# Patient Record
Sex: FEMALE | Race: WHITE | NOT HISPANIC OR LATINO
[De-identification: names, ages, dates, MRNs, and addresses within clinical notes are randomized per-mention and may not be internally consistent; named-entity substitution may affect disease eponyms.]

---

## 2022-06-17 ENCOUNTER — TRANSCRIPTION ENCOUNTER (OUTPATIENT)
Age: 67
End: 2022-06-17

## 2022-06-18 ENCOUNTER — INPATIENT (INPATIENT)
Facility: HOSPITAL | Age: 67
LOS: 0 days | Discharge: HOME | DRG: 340 | End: 2022-06-19
Attending: SURGERY | Admitting: SURGERY
Payer: COMMERCIAL

## 2022-06-18 ENCOUNTER — RESULT REVIEW (OUTPATIENT)
Age: 67
End: 2022-06-18

## 2022-06-18 ENCOUNTER — TRANSCRIPTION ENCOUNTER (OUTPATIENT)
Age: 67
End: 2022-06-18

## 2022-06-18 VITALS
HEART RATE: 105 BPM | RESPIRATION RATE: 16 BRPM | OXYGEN SATURATION: 98 % | TEMPERATURE: 98 F | HEIGHT: 66.14 IN | DIASTOLIC BLOOD PRESSURE: 99 MMHG | WEIGHT: 136.69 LBS | SYSTOLIC BLOOD PRESSURE: 170 MMHG

## 2022-06-18 DIAGNOSIS — Z92.21 PERSONAL HISTORY OF ANTINEOPLASTIC CHEMOTHERAPY: ICD-10-CM

## 2022-06-18 DIAGNOSIS — E78.5 HYPERLIPIDEMIA, UNSPECIFIED: ICD-10-CM

## 2022-06-18 DIAGNOSIS — I10 ESSENTIAL (PRIMARY) HYPERTENSION: ICD-10-CM

## 2022-06-18 DIAGNOSIS — Z98.890 OTHER SPECIFIED POSTPROCEDURAL STATES: Chronic | ICD-10-CM

## 2022-06-18 DIAGNOSIS — D18.03 HEMANGIOMA OF INTRA-ABDOMINAL STRUCTURES: ICD-10-CM

## 2022-06-18 DIAGNOSIS — Z88.0 ALLERGY STATUS TO PENICILLIN: ICD-10-CM

## 2022-06-18 DIAGNOSIS — Z98.891 HISTORY OF UTERINE SCAR FROM PREVIOUS SURGERY: Chronic | ICD-10-CM

## 2022-06-18 DIAGNOSIS — K35.33 ACUTE APPENDICITIS WITH PERFORATION, LOCALIZED PERITONITIS, AND GANGRENE, WITH ABSCESS: ICD-10-CM

## 2022-06-18 DIAGNOSIS — Z85.3 PERSONAL HISTORY OF MALIGNANT NEOPLASM OF BREAST: ICD-10-CM

## 2022-06-18 LAB
ALBUMIN SERPL ELPH-MCNC: 3.5 G/DL — SIGNIFICANT CHANGE UP (ref 3.4–5)
ALP SERPL-CCNC: 62 U/L — SIGNIFICANT CHANGE UP (ref 40–120)
ALT FLD-CCNC: 61 U/L — HIGH (ref 12–42)
ANION GAP SERPL CALC-SCNC: 7 MMOL/L — LOW (ref 9–16)
APPEARANCE UR: CLEAR — SIGNIFICANT CHANGE UP
APTT BLD: 28.2 SEC — SIGNIFICANT CHANGE UP (ref 27.5–35.5)
AST SERPL-CCNC: 42 U/L — HIGH (ref 15–37)
BACTERIA # UR AUTO: PRESENT /HPF
BASOPHILS # BLD AUTO: 0.04 K/UL — SIGNIFICANT CHANGE UP (ref 0–0.2)
BASOPHILS NFR BLD AUTO: 0.3 % — SIGNIFICANT CHANGE UP (ref 0–2)
BILIRUB SERPL-MCNC: 0.9 MG/DL — SIGNIFICANT CHANGE UP (ref 0.2–1.2)
BILIRUB UR-MCNC: NEGATIVE — SIGNIFICANT CHANGE UP
BLD GP AB SCN SERPL QL: NEGATIVE — SIGNIFICANT CHANGE UP
BLD GP AB SCN SERPL QL: NEGATIVE — SIGNIFICANT CHANGE UP
BUN SERPL-MCNC: 14 MG/DL — SIGNIFICANT CHANGE UP (ref 7–23)
CALCIUM SERPL-MCNC: 8.9 MG/DL — SIGNIFICANT CHANGE UP (ref 8.5–10.5)
CHLORIDE SERPL-SCNC: 101 MMOL/L — SIGNIFICANT CHANGE UP (ref 96–108)
CO2 SERPL-SCNC: 29 MMOL/L — SIGNIFICANT CHANGE UP (ref 22–31)
COLOR SPEC: YELLOW — SIGNIFICANT CHANGE UP
CREAT SERPL-MCNC: 0.76 MG/DL — SIGNIFICANT CHANGE UP (ref 0.5–1.3)
DIFF PNL FLD: ABNORMAL
EGFR: 86 ML/MIN/1.73M2 — SIGNIFICANT CHANGE UP
EOSINOPHIL # BLD AUTO: 0.01 K/UL — SIGNIFICANT CHANGE UP (ref 0–0.5)
EOSINOPHIL NFR BLD AUTO: 0.1 % — SIGNIFICANT CHANGE UP (ref 0–6)
EPI CELLS # UR: ABNORMAL /HPF (ref 0–5)
GLUCOSE SERPL-MCNC: 132 MG/DL — HIGH (ref 70–99)
GLUCOSE UR QL: NEGATIVE — SIGNIFICANT CHANGE UP
HCT VFR BLD CALC: 42.6 % — SIGNIFICANT CHANGE UP (ref 34.5–45)
HGB BLD-MCNC: 14 G/DL — SIGNIFICANT CHANGE UP (ref 11.5–15.5)
IMM GRANULOCYTES NFR BLD AUTO: 0.6 % — SIGNIFICANT CHANGE UP (ref 0–1.5)
INR BLD: 1.06 — SIGNIFICANT CHANGE UP (ref 0.88–1.16)
KETONES UR-MCNC: NEGATIVE — SIGNIFICANT CHANGE UP
LACTATE SERPL-SCNC: 0.7 MMOL/L — SIGNIFICANT CHANGE UP (ref 0.4–2)
LEUKOCYTE ESTERASE UR-ACNC: NEGATIVE — SIGNIFICANT CHANGE UP
LIDOCAIN IGE QN: 54 U/L — LOW (ref 73–393)
LYMPHOCYTES # BLD AUTO: 1.01 K/UL — SIGNIFICANT CHANGE UP (ref 1–3.3)
LYMPHOCYTES # BLD AUTO: 6.5 % — LOW (ref 13–44)
MCHC RBC-ENTMCNC: 31.7 PG — SIGNIFICANT CHANGE UP (ref 27–34)
MCHC RBC-ENTMCNC: 32.9 GM/DL — SIGNIFICANT CHANGE UP (ref 32–36)
MCV RBC AUTO: 96.4 FL — SIGNIFICANT CHANGE UP (ref 80–100)
MONOCYTES # BLD AUTO: 0.99 K/UL — HIGH (ref 0–0.9)
MONOCYTES NFR BLD AUTO: 6.4 % — SIGNIFICANT CHANGE UP (ref 2–14)
NEUTROPHILS # BLD AUTO: 13.38 K/UL — HIGH (ref 1.8–7.4)
NEUTROPHILS NFR BLD AUTO: 86.1 % — HIGH (ref 43–77)
NITRITE UR-MCNC: NEGATIVE — SIGNIFICANT CHANGE UP
NRBC # BLD: 0 /100 WBCS — SIGNIFICANT CHANGE UP (ref 0–0)
PH UR: 6 — SIGNIFICANT CHANGE UP (ref 5–8)
PLATELET # BLD AUTO: 222 K/UL — SIGNIFICANT CHANGE UP (ref 150–400)
POTASSIUM SERPL-MCNC: 3.6 MMOL/L — SIGNIFICANT CHANGE UP (ref 3.5–5.3)
POTASSIUM SERPL-SCNC: 3.6 MMOL/L — SIGNIFICANT CHANGE UP (ref 3.5–5.3)
PROT SERPL-MCNC: 7.7 G/DL — SIGNIFICANT CHANGE UP (ref 6.4–8.2)
PROT UR-MCNC: NEGATIVE MG/DL — SIGNIFICANT CHANGE UP
PROTHROM AB SERPL-ACNC: 12.4 SEC — SIGNIFICANT CHANGE UP (ref 10.5–13.4)
RBC # BLD: 4.42 M/UL — SIGNIFICANT CHANGE UP (ref 3.8–5.2)
RBC # FLD: 12.3 % — SIGNIFICANT CHANGE UP (ref 10.3–14.5)
RBC CASTS # UR COMP ASSIST: < 5 /HPF — SIGNIFICANT CHANGE UP
RH IG SCN BLD-IMP: POSITIVE — SIGNIFICANT CHANGE UP
RH IG SCN BLD-IMP: POSITIVE — SIGNIFICANT CHANGE UP
SARS-COV-2 RNA SPEC QL NAA+PROBE: SIGNIFICANT CHANGE UP
SODIUM SERPL-SCNC: 137 MMOL/L — SIGNIFICANT CHANGE UP (ref 132–145)
SP GR SPEC: <=1.005 — SIGNIFICANT CHANGE UP (ref 1–1.03)
UROBILINOGEN FLD QL: 0.2 E.U./DL — SIGNIFICANT CHANGE UP
WBC # BLD: 15.52 K/UL — HIGH (ref 3.8–10.5)
WBC # FLD AUTO: 15.52 K/UL — HIGH (ref 3.8–10.5)
WBC UR QL: < 5 /HPF — SIGNIFICANT CHANGE UP

## 2022-06-18 PROCEDURE — 74176 CT ABD & PELVIS W/O CONTRAST: CPT | Mod: 26

## 2022-06-18 PROCEDURE — 99285 EMERGENCY DEPT VISIT HI MDM: CPT

## 2022-06-18 PROCEDURE — 88304 TISSUE EXAM BY PATHOLOGIST: CPT | Mod: 26

## 2022-06-18 PROCEDURE — 44970 LAPAROSCOPY APPENDECTOMY: CPT | Mod: GC

## 2022-06-18 PROCEDURE — 99222 1ST HOSP IP/OBS MODERATE 55: CPT | Mod: GC,57

## 2022-06-18 DEVICE — STAPLER COVIDIEN TRI-STAPLE 60MM PURPLE RELOAD: Type: IMPLANTABLE DEVICE | Status: FUNCTIONAL

## 2022-06-18 RX ORDER — METRONIDAZOLE 500 MG
500 TABLET ORAL ONCE
Refills: 0 | Status: COMPLETED | OUTPATIENT
Start: 2022-06-18 | End: 2022-06-18

## 2022-06-18 RX ORDER — LOSARTAN POTASSIUM 100 MG/1
1 TABLET, FILM COATED ORAL
Qty: 0 | Refills: 0 | DISCHARGE

## 2022-06-18 RX ORDER — ATORVASTATIN CALCIUM 80 MG/1
20 TABLET, FILM COATED ORAL AT BEDTIME
Refills: 0 | Status: DISCONTINUED | OUTPATIENT
Start: 2022-06-18 | End: 2022-06-19

## 2022-06-18 RX ORDER — SODIUM CHLORIDE 9 MG/ML
1000 INJECTION INTRAMUSCULAR; INTRAVENOUS; SUBCUTANEOUS ONCE
Refills: 0 | Status: COMPLETED | OUTPATIENT
Start: 2022-06-18 | End: 2022-06-18

## 2022-06-18 RX ORDER — MORPHINE SULFATE 50 MG/1
4 CAPSULE, EXTENDED RELEASE ORAL ONCE
Refills: 0 | Status: DISCONTINUED | OUTPATIENT
Start: 2022-06-18 | End: 2022-06-18

## 2022-06-18 RX ORDER — SODIUM CHLORIDE 9 MG/ML
1000 INJECTION, SOLUTION INTRAVENOUS
Refills: 0 | Status: DISCONTINUED | OUTPATIENT
Start: 2022-06-18 | End: 2022-06-19

## 2022-06-18 RX ORDER — HEPARIN SODIUM 5000 [USP'U]/ML
5000 INJECTION INTRAVENOUS; SUBCUTANEOUS EVERY 8 HOURS
Refills: 0 | Status: DISCONTINUED | OUTPATIENT
Start: 2022-06-18 | End: 2022-06-19

## 2022-06-18 RX ORDER — ONDANSETRON 8 MG/1
4 TABLET, FILM COATED ORAL ONCE
Refills: 0 | Status: COMPLETED | OUTPATIENT
Start: 2022-06-18 | End: 2022-06-18

## 2022-06-18 RX ORDER — KETOROLAC TROMETHAMINE 30 MG/ML
15 SYRINGE (ML) INJECTION ONCE
Refills: 0 | Status: DISCONTINUED | OUTPATIENT
Start: 2022-06-18 | End: 2022-06-18

## 2022-06-18 RX ORDER — DIATRIZOATE MEGLUMINE 180 MG/ML
30 INJECTION, SOLUTION INTRAVESICAL ONCE
Refills: 0 | Status: COMPLETED | OUTPATIENT
Start: 2022-06-18 | End: 2022-06-18

## 2022-06-18 RX ORDER — ACETAMINOPHEN 500 MG
650 TABLET ORAL ONCE
Refills: 0 | Status: COMPLETED | OUTPATIENT
Start: 2022-06-18 | End: 2022-06-18

## 2022-06-18 RX ORDER — CIPROFLOXACIN LACTATE 400MG/40ML
400 VIAL (ML) INTRAVENOUS ONCE
Refills: 0 | Status: COMPLETED | OUTPATIENT
Start: 2022-06-18 | End: 2022-06-18

## 2022-06-18 RX ORDER — METRONIDAZOLE 500 MG
500 TABLET ORAL EVERY 8 HOURS
Refills: 0 | Status: DISCONTINUED | OUTPATIENT
Start: 2022-06-18 | End: 2022-06-19

## 2022-06-18 RX ORDER — ROSUVASTATIN CALCIUM 5 MG/1
1 TABLET ORAL
Qty: 0 | Refills: 0 | DISCHARGE

## 2022-06-18 RX ORDER — CIPROFLOXACIN LACTATE 400MG/40ML
400 VIAL (ML) INTRAVENOUS EVERY 12 HOURS
Refills: 0 | Status: DISCONTINUED | OUTPATIENT
Start: 2022-06-18 | End: 2022-06-19

## 2022-06-18 RX ADMIN — SODIUM CHLORIDE 1000 MILLILITER(S): 9 INJECTION INTRAMUSCULAR; INTRAVENOUS; SUBCUTANEOUS at 11:56

## 2022-06-18 RX ADMIN — Medication 100 MILLIGRAM(S): at 17:01

## 2022-06-18 RX ADMIN — DIATRIZOATE MEGLUMINE 30 MILLILITER(S): 180 INJECTION, SOLUTION INTRAVESICAL at 11:56

## 2022-06-18 RX ADMIN — MORPHINE SULFATE 4 MILLIGRAM(S): 50 CAPSULE, EXTENDED RELEASE ORAL at 17:55

## 2022-06-18 RX ADMIN — MORPHINE SULFATE 4 MILLIGRAM(S): 50 CAPSULE, EXTENDED RELEASE ORAL at 11:55

## 2022-06-18 RX ADMIN — Medication 650 MILLIGRAM(S): at 18:15

## 2022-06-18 RX ADMIN — Medication 15 MILLIGRAM(S): at 13:15

## 2022-06-18 RX ADMIN — Medication 200 MILLIGRAM(S): at 20:04

## 2022-06-18 RX ADMIN — ONDANSETRON 4 MILLIGRAM(S): 8 TABLET, FILM COATED ORAL at 11:56

## 2022-06-18 NOTE — ED ADULT NURSE REASSESSMENT NOTE - NS ED NURSE REASSESS COMMENT FT1
Pt resting in bed at this time. Pt deneis pain at this time. Pt updated on plan of care. Pending CT. Will contiue to monitor.

## 2022-06-18 NOTE — H&P ADULT - NSHPPHYSICALEXAM_GEN_ALL_CORE
LOS: 1d    VITALS:   T(C): 36.1 (06-18-22 @ 23:35), Max: 37.8 (06-18-22 @ 17:51)  HR: 93 (06-18-22 @ 23:46) (91 - 105)  BP: 138/73 (06-18-22 @ 23:46) (134/86 - 170/99)  RR: 25 (06-18-22 @ 23:46) (16 - 25)  SpO2: 97% (06-18-22 @ 23:46) (96% - 100%)    GENERAL: NAD, lying in bed comfortably  CHEST/LUNG: Unlabored respirations  HEART: Regular rate and rhythm;   ABDOMEN: Soft, tender at Mcburnie's point, nondistended  EXTREMITIES:  2+ Peripheral Pulses, brisk capillary refill. No clubbing, cyanosis, or edema  NERVOUS SYSTEM:  A&Ox3, no focal deficits   SKIN: No rashes or lesions

## 2022-06-18 NOTE — ED ADULT NURSE NOTE - OBJECTIVE STATEMENT
Pt is a 66y female complaining of RLQ abd pain associated with nausea, bloating and constipation x 2 days. Pt with RLQ tenderness. Pt denies fever and chills.

## 2022-06-18 NOTE — ED PROVIDER NOTE - NSICDXPASTMEDICALHX_GEN_ALL_CORE_FT
PAST MEDICAL HISTORY:  Breast cancer S/P lumpectomy & chemotherapy in 2015 (normal PET scan within past year)    Hyperlipidemia     Hypertension

## 2022-06-18 NOTE — ED ADULT NURSE NOTE - NSIMPLEMENTINTERV_GEN_ALL_ED
Implemented All Universal Safety Interventions:  [place] to call system. Call bell, personal items and telephone within reach. Instruct patient to call for assistance. Room bathroom lighting operational. Non-slip footwear when patient is off stretcher. Physically safe environment: no spills, clutter or unnecessary equipment. Stretcher in lowest position, wheels locked, appropriate side rails in place.

## 2022-06-18 NOTE — ED PROVIDER NOTE - OBJECTIVE STATEMENT
She is an extremely pleasant 66 yr old female who lives in [place], here with her son, whom she's currently visiting in [facility], who presents with She is an extremely pleasant 66 yr old female who lives in [place], here with her son, whom she's currently visiting in [facility], who presents with RUQ & RLQ abdominal pain which began 2 nights ago before flying to [facility].  She had one episode of vomiting yesterday and has had nausea & anorexia since then, with worsening pain today, now radiating to her right flank and into her back.  At first, she attributed it to a muscular strain, but she hasn't had any injury/fall.  She denies any fevers/chills.  BMs have been fairly normal except for one diarrheal stool.  No hematochezia or melena.  No urinary symptoms.  No hx of gall bladder issues or kidney stones.  Had a colonoscopy & polypectomy in the past.  No hx of diverticulitis.

## 2022-06-18 NOTE — H&P ADULT - NSHPLABSRESULTS_GEN_ALL_CORE
CBC Full  -  ( 18 Jun 2022 11:48 )  WBC Count : 15.52 K/uL  RBC Count : 4.42 M/uL  Hemoglobin : 14.0 g/dL  Hematocrit : 42.6 %  Platelet Count - Automated : 222 K/uL  Mean Cell Volume : 96.4 fl  Mean Cell Hemoglobin : 31.7 pg  Mean Cell Hemoglobin Concentration : 32.9 gm/dL  Auto Neutrophil # : 13.38 K/uL  Auto Lymphocyte # : 1.01 K/uL  Auto Monocyte # : 0.99 K/uL  Auto Eosinophil # : 0.01 K/uL  Auto Basophil # : 0.04 K/uL  Auto Neutrophil % : 86.1 %  Auto Lymphocyte % : 6.5 %  Auto Monocyte % : 6.4 %  Auto Eosinophil % : 0.1 %  Auto Basophil % : 0.3 %    06-18    137  |  101  |  14  ----------------------------<  132<H>  3.6   |  29  |  0.76    Ca    8.9      18 Jun 2022 11:48    TPro  7.7  /  Alb  3.5  /  TBili  0.9  /  DBili  x   /  AST  42<H>  /  ALT  61<H>  /  AlkPhos  62  06-18    LIVER FUNCTIONS - ( 18 Jun 2022 11:48 )  Alb: 3.5 g/dL / Pro: 7.7 g/dL / ALK PHOS: 62 U/L / ALT: 61 U/L / AST: 42 U/L / GGT: x           CAPILLARY BLOOD GLUCOSE        Urinalysis Basic - ( 18 Jun 2022 11:49 )    Color: Yellow / Appearance: Clear / SG: <=1.005 / pH: x  Gluc: x / Ketone: NEGATIVE  / Bili: NEGATIVE / Urobili: 0.2 E.U./dL   Blood: x / Protein: NEGATIVE mg/dL / Nitrite: NEGATIVE   Leuk Esterase: NEGATIVE / RBC: < 5 /HPF / WBC < 5 /HPF   Sq Epi: x / Non Sq Epi: 5-10 /HPF / Bacteria: Present /HPF      PT/INR - ( 18 Jun 2022 16:50 )   PT: 12.4 sec;   INR: 1.06          PTT - ( 18 Jun 2022 16:50 )  PTT:28.2 sec

## 2022-06-18 NOTE — H&P ADULT - ASSESSMENT
65 yo female with PMH of HTN, HLD and past PSH significant for left lumpectomy, craniectomy and  x 2 presenting with 2 days of abdominal pain. CT scan () showed appendicitis and accidental liver nodules.     OR tonight - added on  NPO/IVF  Pain/Nausea PRN  Ceft/flagyl  HSQ/OOBA/IS  AM labs

## 2022-06-18 NOTE — ED ADULT TRIAGE NOTE - CHIEF COMPLAINT QUOTE
Pt walked in with c/o RLQ pain with sudden onset 2 days ago. Reports constipation, bloating, vomited x1 yesterday.

## 2022-06-18 NOTE — CHART NOTE - NSCHARTNOTEFT_GEN_A_CORE
66F w/ hx HLD, HTN, , remote hx head trauma requiring andrew hole at age 24, lumpectomy, presents w/ 2 days of abd pain consistent with appendicitis on exam and imaging. AVSS. WBC 15.5. CT w/ 1.8cm fluid filled appendix w/ appendicolith and fat stranding, as well as several indeterminate hepatic lesions. Patient denies fevers or weight loss, no contributory family hx. Given abx, taking to OR for appendectomy, will possibly biopsy liver lesion if possible but primary purpose of this operation is infection control. Discussed w/ Dr. Quintana.

## 2022-06-18 NOTE — ED PROVIDER NOTE - CLINICAL SUMMARY MEDICAL DECISION MAKING FREE TEXT BOX
Pt here with RLQ pain x 2 days.  Differential dx = appendicitis, cholecystitis, pyelonephritis, ureteral stone, diverticulitis, colitis  CBC reveals leukocytosis with a slight left shift.  Lactate WNL.  Urinalysis WNL.  Mild elevation of liver transaminases.  CT abdomen/pelvis (oral contrast only) done & results reviewed.  She appears to have acute appendicitis, but also has four large lesions on her liver which are suspicious for malignant metastases.  Case discussed with Dr. Sandip Quintana, who is on call for General Surgery at [facility].  He agreed to admit her to the hospital.  I discussed the possibility of breast cancer recurrence with the patient, as well as even more rare possiblity of appendiceal cancer.  She likely will need an MRI of her abdomen and/or biopsy of the lesions at some point (either here or in [place]).  She was given IV antibiotics here (Cipro & Flagyl) prior to transfer.  She had tongue swelling with PCN in past & cannot remember if she has ever had cephalosporins or not.  Pain improved after being given IV morphine, and she had one dose of IV Toradol early in her case, when morphine alone wasn't helping.

## 2022-06-18 NOTE — BRIEF OPERATIVE NOTE - OPERATION/FINDINGS
Appendix identified (perforated, gangrenous). Appendix mobilized off cecum bluntly and w/ Harmonic. Mesoappendix divided using Harmonic. Appendix amputated at base near cecum using EndoGIA stapler. Stump inspected laparoscopically. Hemostasis achieved. Fascia closed w/ Vicryl sutures.

## 2022-06-18 NOTE — ED PROVIDER NOTE - GASTROINTESTINAL, MLM
Abdomen soft, non-distended.  Markedly tender in the RLQ (over [place]Burney's Point) with voluntary guarding.  No rebound tenderness.  Mild-moderate RUQ tenderness.  No hepatosplenomegaly.  No CVA tenderness.  No hernias or masses.

## 2022-06-19 ENCOUNTER — TRANSCRIPTION ENCOUNTER (OUTPATIENT)
Age: 67
End: 2022-06-19

## 2022-06-19 VITALS
HEART RATE: 86 BPM | TEMPERATURE: 98 F | DIASTOLIC BLOOD PRESSURE: 75 MMHG | RESPIRATION RATE: 18 BRPM | SYSTOLIC BLOOD PRESSURE: 126 MMHG | OXYGEN SATURATION: 95 %

## 2022-06-19 LAB
ANION GAP SERPL CALC-SCNC: 14 MMOL/L — SIGNIFICANT CHANGE UP (ref 5–17)
BUN SERPL-MCNC: 7 MG/DL — SIGNIFICANT CHANGE UP (ref 7–23)
CALCIUM SERPL-MCNC: 8.6 MG/DL — SIGNIFICANT CHANGE UP (ref 8.4–10.5)
CHLORIDE SERPL-SCNC: 102 MMOL/L — SIGNIFICANT CHANGE UP (ref 96–108)
CO2 SERPL-SCNC: 21 MMOL/L — LOW (ref 22–31)
CREAT SERPL-MCNC: 0.63 MG/DL — SIGNIFICANT CHANGE UP (ref 0.5–1.3)
EGFR: 98 ML/MIN/1.73M2 — SIGNIFICANT CHANGE UP
GLUCOSE SERPL-MCNC: 187 MG/DL — HIGH (ref 70–99)
HCT VFR BLD CALC: 42.8 % — SIGNIFICANT CHANGE UP (ref 34.5–45)
HCV AB S/CO SERPL IA: 0.04 S/CO — SIGNIFICANT CHANGE UP
HCV AB SERPL-IMP: SIGNIFICANT CHANGE UP
HGB BLD-MCNC: 13.8 G/DL — SIGNIFICANT CHANGE UP (ref 11.5–15.5)
MAGNESIUM SERPL-MCNC: 1.8 MG/DL — SIGNIFICANT CHANGE UP (ref 1.6–2.6)
MCHC RBC-ENTMCNC: 31.6 PG — SIGNIFICANT CHANGE UP (ref 27–34)
MCHC RBC-ENTMCNC: 32.2 GM/DL — SIGNIFICANT CHANGE UP (ref 32–36)
MCV RBC AUTO: 97.9 FL — SIGNIFICANT CHANGE UP (ref 80–100)
NRBC # BLD: 0 /100 WBCS — SIGNIFICANT CHANGE UP (ref 0–0)
PHOSPHATE SERPL-MCNC: 2.4 MG/DL — LOW (ref 2.5–4.5)
PLATELET # BLD AUTO: 207 K/UL — SIGNIFICANT CHANGE UP (ref 150–400)
POTASSIUM SERPL-MCNC: 3.7 MMOL/L — SIGNIFICANT CHANGE UP (ref 3.5–5.3)
POTASSIUM SERPL-SCNC: 3.7 MMOL/L — SIGNIFICANT CHANGE UP (ref 3.5–5.3)
RBC # BLD: 4.37 M/UL — SIGNIFICANT CHANGE UP (ref 3.8–5.2)
RBC # FLD: 12.4 % — SIGNIFICANT CHANGE UP (ref 10.3–14.5)
SODIUM SERPL-SCNC: 137 MMOL/L — SIGNIFICANT CHANGE UP (ref 135–145)
WBC # BLD: 11.95 K/UL — HIGH (ref 3.8–10.5)
WBC # FLD AUTO: 11.95 K/UL — HIGH (ref 3.8–10.5)

## 2022-06-19 PROCEDURE — 83690 ASSAY OF LIPASE: CPT

## 2022-06-19 PROCEDURE — 83605 ASSAY OF LACTIC ACID: CPT

## 2022-06-19 PROCEDURE — 85027 COMPLETE CBC AUTOMATED: CPT

## 2022-06-19 PROCEDURE — 99285 EMERGENCY DEPT VISIT HI MDM: CPT | Mod: 25

## 2022-06-19 PROCEDURE — 85025 COMPLETE CBC W/AUTO DIFF WBC: CPT

## 2022-06-19 PROCEDURE — 96374 THER/PROPH/DIAG INJ IV PUSH: CPT

## 2022-06-19 PROCEDURE — 86803 HEPATITIS C AB TEST: CPT

## 2022-06-19 PROCEDURE — 80048 BASIC METABOLIC PNL TOTAL CA: CPT

## 2022-06-19 PROCEDURE — 85730 THROMBOPLASTIN TIME PARTIAL: CPT

## 2022-06-19 PROCEDURE — C1889: CPT

## 2022-06-19 PROCEDURE — 86901 BLOOD TYPING SEROLOGIC RH(D): CPT

## 2022-06-19 PROCEDURE — 36415 COLL VENOUS BLD VENIPUNCTURE: CPT

## 2022-06-19 PROCEDURE — 83735 ASSAY OF MAGNESIUM: CPT

## 2022-06-19 PROCEDURE — 84100 ASSAY OF PHOSPHORUS: CPT

## 2022-06-19 PROCEDURE — 86850 RBC ANTIBODY SCREEN: CPT

## 2022-06-19 PROCEDURE — 88304 TISSUE EXAM BY PATHOLOGIST: CPT

## 2022-06-19 PROCEDURE — 86900 BLOOD TYPING SEROLOGIC ABO: CPT

## 2022-06-19 PROCEDURE — 74176 CT ABD & PELVIS W/O CONTRAST: CPT

## 2022-06-19 PROCEDURE — 87635 SARS-COV-2 COVID-19 AMP PRB: CPT

## 2022-06-19 PROCEDURE — 81001 URINALYSIS AUTO W/SCOPE: CPT

## 2022-06-19 PROCEDURE — 96375 TX/PRO/DX INJ NEW DRUG ADDON: CPT

## 2022-06-19 PROCEDURE — 80053 COMPREHEN METABOLIC PANEL: CPT

## 2022-06-19 PROCEDURE — 85610 PROTHROMBIN TIME: CPT

## 2022-06-19 RX ORDER — HYDROMORPHONE HYDROCHLORIDE 2 MG/ML
0.25 INJECTION INTRAMUSCULAR; INTRAVENOUS; SUBCUTANEOUS
Refills: 0 | Status: DISCONTINUED | OUTPATIENT
Start: 2022-06-19 | End: 2022-06-19

## 2022-06-19 RX ORDER — METRONIDAZOLE 500 MG
1 TABLET ORAL
Qty: 7 | Refills: 0
Start: 2022-06-19 | End: 2022-06-20

## 2022-06-19 RX ORDER — CIPROFLOXACIN LACTATE 400MG/40ML
1 VIAL (ML) INTRAVENOUS
Qty: 5 | Refills: 0
Start: 2022-06-19 | End: 2022-06-20

## 2022-06-19 RX ORDER — ACETAMINOPHEN 500 MG
650 TABLET ORAL EVERY 6 HOURS
Refills: 0 | Status: DISCONTINUED | OUTPATIENT
Start: 2022-06-19 | End: 2022-06-19

## 2022-06-19 RX ORDER — MAGNESIUM SULFATE 500 MG/ML
1 VIAL (ML) INJECTION ONCE
Refills: 0 | Status: COMPLETED | OUTPATIENT
Start: 2022-06-19 | End: 2022-06-19

## 2022-06-19 RX ORDER — POTASSIUM PHOSPHATE, MONOBASIC POTASSIUM PHOSPHATE, DIBASIC 236; 224 MG/ML; MG/ML
15 INJECTION, SOLUTION INTRAVENOUS ONCE
Refills: 0 | Status: COMPLETED | OUTPATIENT
Start: 2022-06-19 | End: 2022-06-19

## 2022-06-19 RX ORDER — ONDANSETRON 8 MG/1
4 TABLET, FILM COATED ORAL EVERY 8 HOURS
Refills: 0 | Status: DISCONTINUED | OUTPATIENT
Start: 2022-06-19 | End: 2022-06-19

## 2022-06-19 RX ADMIN — Medication 100 GRAM(S): at 14:18

## 2022-06-19 RX ADMIN — Medication 100 MILLIGRAM(S): at 07:22

## 2022-06-19 RX ADMIN — POTASSIUM PHOSPHATE, MONOBASIC POTASSIUM PHOSPHATE, DIBASIC 62.5 MILLIMOLE(S): 236; 224 INJECTION, SOLUTION INTRAVENOUS at 14:18

## 2022-06-19 RX ADMIN — Medication 100 MILLIGRAM(S): at 15:04

## 2022-06-19 RX ADMIN — Medication 200 MILLIGRAM(S): at 06:18

## 2022-06-19 RX ADMIN — Medication 650 MILLIGRAM(S): at 08:50

## 2022-06-19 RX ADMIN — HEPARIN SODIUM 5000 UNIT(S): 5000 INJECTION INTRAVENOUS; SUBCUTANEOUS at 06:18

## 2022-06-19 NOTE — PROGRESS NOTE ADULT - SUBJECTIVE AND OBJECTIVE BOX
SUBJECTIVE: Patient seen and examined bedside. Pt reports feeling well this morning with no acute complaints. Abdominal pain well controlled. Denies nausea or vomiting. Tolerating clears. Denies flatus or BM.    ciprofloxacin   IVPB 400 milliGRAM(s) IV Intermittent every 12 hours  heparin   Injectable 5000 Unit(s) SubCutaneous every 8 hours  metroNIDAZOLE  IVPB 500 milliGRAM(s) IV Intermittent every 8 hours      Vital Signs Last 24 Hrs  T(C): 36.7 (19 Jun 2022 05:31), Max: 37.8 (18 Jun 2022 17:51)  T(F): 98 (19 Jun 2022 05:31), Max: 100.1 (18 Jun 2022 17:51)  HR: 88 (19 Jun 2022 05:31) (70 - 105)  BP: 135/83 (19 Jun 2022 05:31) (118/58 - 170/99)  BP(mean): 88 (19 Jun 2022 00:46) (83 - 106)  RR: 17 (19 Jun 2022 05:31) (15 - 25)  SpO2: 98% (19 Jun 2022 05:31) (96% - 100%)  I&O's Detail    18 Jun 2022 07:01  -  19 Jun 2022 07:00  --------------------------------------------------------  IN:    IV PiggyBack: 500 mL    Lactated Ringers: 600 mL    Oral Fluid: 60 mL    Other (mL): 1500 mL  Total IN: 2660 mL    OUT:    Blood Loss (mL): 50 mL    Voided (mL): 950 mL  Total OUT: 1000 mL    Total NET: 1660 mL          General: NAD, resting comfortably in bed  C/V: NSR  Pulm: Nonlabored breathing, no respiratory distress  Abd: soft, nondistended, appropriate incisional TTP, incisions CDI, no rebound, no guarding  Extrem: WWP, no edema, SCDs in place        LABS:                        13.8   11.95 )-----------( 207      ( 19 Jun 2022 05:30 )             42.8     06-19    137  |  102  |  x   ----------------------------<  187<H>  3.7   |  x   |  0.63    Ca    8.6      19 Jun 2022 05:30  Mg     1.8     06-19    TPro  7.7  /  Alb  3.5  /  TBili  0.9  /  DBili  x   /  AST  42<H>  /  ALT  61<H>  /  AlkPhos  62  06-18    PT/INR - ( 18 Jun 2022 16:50 )   PT: 12.4 sec;   INR: 1.06          PTT - ( 18 Jun 2022 16:50 )  PTT:28.2 sec  Urinalysis Basic - ( 18 Jun 2022 11:49 )    Color: Yellow / Appearance: Clear / SG: <=1.005 / pH: x  Gluc: x / Ketone: NEGATIVE  / Bili: NEGATIVE / Urobili: 0.2 E.U./dL   Blood: x / Protein: NEGATIVE mg/dL / Nitrite: NEGATIVE   Leuk Esterase: NEGATIVE / RBC: < 5 /HPF / WBC < 5 /HPF   Sq Epi: x / Non Sq Epi: 5-10 /HPF / Bacteria: Present /HPF        RADIOLOGY & ADDITIONAL STUDIES:   SUBJECTIVE: Patient seen and examined bedside. Pt reports feeling well this morning with no acute complaints. Abdominal pain well controlled. Denies nausea or vomiting. Tolerating clear liquid diet. Denies flatus or BM.    ciprofloxacin   IVPB 400 milliGRAM(s) IV Intermittent every 12 hours  heparin   Injectable 5000 Unit(s) SubCutaneous every 8 hours  metroNIDAZOLE  IVPB 500 milliGRAM(s) IV Intermittent every 8 hours      Vital Signs Last 24 Hrs  T(C): 36.7 (19 Jun 2022 05:31), Max: 37.8 (18 Jun 2022 17:51)  T(F): 98 (19 Jun 2022 05:31), Max: 100.1 (18 Jun 2022 17:51)  HR: 88 (19 Jun 2022 05:31) (70 - 105)  BP: 135/83 (19 Jun 2022 05:31) (118/58 - 170/99)  BP(mean): 88 (19 Jun 2022 00:46) (83 - 106)  RR: 17 (19 Jun 2022 05:31) (15 - 25)  SpO2: 98% (19 Jun 2022 05:31) (96% - 100%)  I&O's Detail    18 Jun 2022 07:01  -  19 Jun 2022 07:00  --------------------------------------------------------  IN:    IV PiggyBack: 500 mL    Lactated Ringers: 600 mL    Oral Fluid: 60 mL    Other (mL): 1500 mL  Total IN: 2660 mL    OUT:    Blood Loss (mL): 50 mL    Voided (mL): 950 mL  Total OUT: 1000 mL    Total NET: 1660 mL          General: NAD, resting comfortably in bed  C/V: NSR  Pulm: Nonlabored breathing, no respiratory distress  Abd: soft, nondistended, appropriate incisional TTP, incisions CDI, no rebound, no guarding  Extrem: WWP, no edema, SCDs in place        LABS:                        13.8   11.95 )-----------( 207      ( 19 Jun 2022 05:30 )             42.8     06-19    137  |  102  |  x   ----------------------------<  187<H>  3.7   |  x   |  0.63    Ca    8.6      19 Jun 2022 05:30  Mg     1.8     06-19    TPro  7.7  /  Alb  3.5  /  TBili  0.9  /  DBili  x   /  AST  42<H>  /  ALT  61<H>  /  AlkPhos  62  06-18    PT/INR - ( 18 Jun 2022 16:50 )   PT: 12.4 sec;   INR: 1.06          PTT - ( 18 Jun 2022 16:50 )  PTT:28.2 sec  Urinalysis Basic - ( 18 Jun 2022 11:49 )    Color: Yellow / Appearance: Clear / SG: <=1.005 / pH: x  Gluc: x / Ketone: NEGATIVE  / Bili: NEGATIVE / Urobili: 0.2 E.U./dL   Blood: x / Protein: NEGATIVE mg/dL / Nitrite: NEGATIVE   Leuk Esterase: NEGATIVE / RBC: < 5 /HPF / WBC < 5 /HPF   Sq Epi: x / Non Sq Epi: 5-10 /HPF / Bacteria: Present /HPF        RADIOLOGY & ADDITIONAL STUDIES:

## 2022-06-19 NOTE — DISCHARGE NOTE NURSING/CASE MANAGEMENT/SOCIAL WORK - PATIENT PORTAL LINK FT
You can access the FollowMyHealth Patient Portal offered by [facility] by registering at the following website: [email or web address] By joining [employer]’s FollowMyHealth portal, you will also be able to view your health information using other applications (apps) compatible with our system.

## 2022-06-19 NOTE — DISCHARGE NOTE PROVIDER - NSDCFUADDINST_GEN_ALL_CORE_FT
Follow up with Dr. Quintana in 1-2 weeks. Call the office at the number below to schedule your appointment. You may shower; soap and water over incision sites. Do not scrub. Pat dry when done. No tub bathing or swimming until cleared. Keep incision sites out of the sun as scars will darken. Ambulate as tolerated, but no heavy lifting (>10lbs) or strenuous exercise. You may resume regular diet. You should be urinating at least 3-4x per day. Call the office if you experience increasing abdominal pain, nausea, vomiting, or temperature >101 F.    Please take Tylenol 650mg or Ibuprofen 400mg every six hours as needed for pain.     You have been prescribed antibiotics. Please take Ciprofloxacin 500mg tablet every 12 hours for two days. Please take Metronidazole 500mg tablet every 8 hours for two days.   Please follow up with your primary care doctor within 1 week to arrange for outpatient MRI for incidental finding of liver masses on CT scan.

## 2022-06-19 NOTE — DISCHARGE NOTE PROVIDER - NSDCMRMEDTOKEN_GEN_ALL_CORE_FT
ciprofloxacin 500 mg oral tablet: 1 tab(s) orally 2 times a day   Crestor 20 mg oral tablet: 1 tab(s) orally once a day  losartan 50 mg oral tablet: 1 tab(s) orally once a day  metroNIDAZOLE 500 mg oral tablet: 1 tab(s) orally every 8 hours

## 2022-06-19 NOTE — PROGRESS NOTE ADULT - ATTENDING COMMENTS
Patient seen and examined. s/p laparoscopic appendectomy for gangrenous and perforated appendicitis with generalized peritonitis and abscess. She is recovering well today. One liver lesion was seen intraoperatively. Appeared to be hemangioma like in appearance. She still will require investigation and comparison with prior studies over the years when she resolves this infection. I answered all questions.

## 2022-06-19 NOTE — DISCHARGE NOTE PROVIDER - NSDCCPCAREPLAN_GEN_ALL_CORE_FT
PRINCIPAL DISCHARGE DIAGNOSIS  Diagnosis: Acute appendicitis  Assessment and Plan of Treatment:       SECONDARY DISCHARGE DIAGNOSES  Diagnosis: Liver masses  Assessment and Plan of Treatment:

## 2022-06-19 NOTE — PROGRESS NOTE ADULT - ASSESSMENT
67 yo female with PMH of HTN, HLD and past PSH significant for left lumpectomy, craniectomy and  x 2 presenting with 2 days of abdominal pain. CT scan () showed appendicitis and accidental liver nodules. Now s/p laparoscopic appendectomy for perforated appendicitis    CLD/IVF  Pain/Nausea PRN  Ceft/flagyl  HSQ/OOBA/IS  AM labs   67 yo female with PMH of HTN, HLD and past PSH significant for left lumpectomy, craniectomy and  x 2 presenting with 2 days of abdominal pain. CT scan () showed appendicitis and accidental liver nodules. Now s/p laparoscopic appendectomy for perforated appendicitis on . Doing well post-operatively    Regular diet  Pain/Nausea PRN  Ceft/flagyl  HSQ/OOBA/IS  AM labs

## 2022-06-19 NOTE — DISCHARGE NOTE PROVIDER - CARE PROVIDER_API CALL
Sandip Quintana (MD)  Surgery  [address], [facility], [place] [address]  Phone: [phone]  Fax: [phone]  Follow Up Time:

## 2022-06-19 NOTE — PACU DISCHARGE NOTE - NAUSEA/VOMITING:
patient is alert, oriented x3-4, Persian speaking, no sob, resp even nonlabored, 
intermittent mild wheezing, on breathing tx, effective,skin warm and dry to touch, no acute 
distress noted, closely monitoring for blood sugar, continue to manage with sliding scale 
and diet, lasix IV administered as ordered, K replaced, None

## 2022-06-19 NOTE — DISCHARGE NOTE PROVIDER - HOSPITAL COURSE
67 yo female with PMH of HTN, HLD and past PSH significant for left lumpectomy, craniectomy and  x 2 presented on  with 2 days of abdominal pain. CT scan () showed appendicitis and accidental liver nodules. Pt was taken to the OR on  for a laparoscopic appendectomy. The appendix was found to be perforated, but there were no intraoperative complications and the patient recovered well in the PACU. The rest of the patient's hospital course was unremarkable. Their pain was well controlled, they were able to tolerate a regular diet without nausea or vomiting, they had return of bowel function, and they were able to ambulate without assistance. At time of discharge, patient was afebrile, HDS, and deemed clinically fit for discharge home with x2 days of oral antibiotics.

## 2022-06-19 NOTE — DISCHARGE NOTE PROVIDER - CARE PROVIDERS DIRECT ADDRESSES
[email or web address]

## 2022-06-19 NOTE — DISCHARGE NOTE NURSING/CASE MANAGEMENT/SOCIAL WORK - NSDCPEFALRISK_GEN_ALL_CORE
For information on Fall & Injury Prevention, visit: [email or web address] OR  [email or web address] OR  https://www.cdc.gov/steadi/patient.html

## 2022-06-19 NOTE — PROGRESS NOTE ADULT - SUBJECTIVE AND OBJECTIVE BOX
Procedure: Laparoscopic appendectomy  Surgeon: Dr. Quintana    S: Pt has no complaints. Denies CP, SOB, nausea, calf tenderness. Pain controlled with medication.    O:  T(C): 37.1 (06-19-22 @ 01:18), Max: 37.1 (06-19-22 @ 01:18)  T(F): 98.8 (06-19-22 @ 01:18), Max: 98.8 (06-19-22 @ 01:18)  HR: 102 (06-19-22 @ 01:18) (70 - 102)  BP: 128/79 (06-19-22 @ 01:18) (118/58 - 153/86)  RR: 17 (06-19-22 @ 01:18) (15 - 25)  SpO2: 100% (06-19-22 @ 01:18) (96% - 100%)  Wt(kg): --                        14.0   15.52 )-----------( 222      ( 18 Jun 2022 11:48 )             42.6     06-18    137  |  101  |  14  ----------------------------<  132<H>  3.6   |  29  |  0.76    Ca    8.9      18 Jun 2022 11:48    TPro  7.7  /  Alb  3.5  /  TBili  0.9  /  DBili  x   /  AST  42<H>  /  ALT  61<H>  /  AlkPhos  62  06-18      Gen: NAD, resting comfortably in bed  C/V: NSR  Pulm: Nonlabored breathing, no respiratory distress  Abd: soft, minimally tender to palpation, ND Incisions c/d/i  Extrem: WWP, no calf edema, SCDs in place      A/P: 66yFemale s/p above procedure  Diet: CLD - advance as tolerated  IVF: LR  Pain/nausea control  DVT ppx: HSQ  Dispo plan: obs on regional floor

## 2022-06-19 NOTE — PACU DISCHARGE NOTE - COMMENTS
Patient is A&OX4. Denies any pain during PACU stay. VSS. Tolerated clears. LR at 100cc/hr. Has 3 lap sites closed with Dermabond. Met PACU requirements. Report given to floor RN. Patient to be taken down to room by RNs via stretcher in NAD

## 2022-06-21 ENCOUNTER — APPOINTMENT (OUTPATIENT)
Dept: SURGERY | Facility: CLINIC | Age: 67
End: 2022-06-21

## 2022-06-21 ENCOUNTER — APPOINTMENT (OUTPATIENT)
Dept: SURGERY | Facility: CLINIC | Age: 67
End: 2022-06-21
Payer: SELF-PAY

## 2022-06-21 VITALS
DIASTOLIC BLOOD PRESSURE: 99 MMHG | SYSTOLIC BLOOD PRESSURE: 167 MMHG | OXYGEN SATURATION: 96 % | HEART RATE: 71 BPM | HEIGHT: 66 IN | TEMPERATURE: 97.2 F | WEIGHT: 143 LBS | BODY MASS INDEX: 22.98 KG/M2

## 2022-06-21 DIAGNOSIS — E78.00 PURE HYPERCHOLESTEROLEMIA, UNSPECIFIED: ICD-10-CM

## 2022-06-21 DIAGNOSIS — Z72.89 OTHER PROBLEMS RELATED TO LIFESTYLE: ICD-10-CM

## 2022-06-21 DIAGNOSIS — Z82.49 FAMILY HISTORY OF ISCHEMIC HEART DISEASE AND OTHER DISEASES OF THE CIRCULATORY SYSTEM: ICD-10-CM

## 2022-06-21 DIAGNOSIS — Z83.3 FAMILY HISTORY OF DIABETES MELLITUS: ICD-10-CM

## 2022-06-21 DIAGNOSIS — I10 ESSENTIAL (PRIMARY) HYPERTENSION: ICD-10-CM

## 2022-06-21 DIAGNOSIS — Z85.3 PERSONAL HISTORY OF MALIGNANT NEOPLASM OF BREAST: ICD-10-CM

## 2022-06-21 DIAGNOSIS — Z78.9 OTHER SPECIFIED HEALTH STATUS: ICD-10-CM

## 2022-06-21 DIAGNOSIS — Z90.49 ACQUIRED ABSENCE OF OTHER SPECIFIED PARTS OF DIGESTIVE TRACT: ICD-10-CM

## 2022-06-21 PROBLEM — C50.919 MALIGNANT NEOPLASM OF UNSPECIFIED SITE OF UNSPECIFIED FEMALE BREAST: Chronic | Status: ACTIVE | Noted: 2022-06-18

## 2022-06-21 PROBLEM — E78.5 HYPERLIPIDEMIA, UNSPECIFIED: Chronic | Status: ACTIVE | Noted: 2022-06-18

## 2022-06-21 PROBLEM — Z00.00 ENCOUNTER FOR PREVENTIVE HEALTH EXAMINATION: Status: ACTIVE | Noted: 2022-06-21

## 2022-06-21 PROCEDURE — 99024 POSTOP FOLLOW-UP VISIT: CPT

## 2022-07-01 LAB — SURGICAL PATHOLOGY STUDY: SIGNIFICANT CHANGE UP

## 2022-07-05 ENCOUNTER — NON-APPOINTMENT (OUTPATIENT)
Age: 67
End: 2022-07-05

## 2024-04-01 NOTE — ED ADULT NURSE NOTE - CAS EDP DISCH DISPOSITION ADMI
From: Lyia Sales  To: Jasvir Tucker  Sent: 4/1/2024 10:34 AM EDT  Subject: New Work Conditions     Hello,    I need a letter stating the conditions under which I can return to the office since my cast has been removed. I am still experiencing a great deal of discomfort.     Best Regards,    JANEEN Sales   [facility]

## 2025-06-05 NOTE — H&P ADULT - NSHPROSALLOTHERNEGRD_GEN_ALL_CORE
Detail Level: Detailed X Size Of Lesion In Cm (Optional): 0 All other review of systems negative, except as noted in HPI

## (undated) DEVICE — BLADE SURGICAL #15 CARBON

## (undated) DEVICE — ENDOCATCH 10MM SPECIMEN POUCH

## (undated) DEVICE — SHEARS HARMONIC ACE 5MM X 36CM CURVED TIP

## (undated) DEVICE — GLV 7.5 PROTEXIS (WHITE)

## (undated) DEVICE — WARMING BLANKET UPPER ADULT

## (undated) DEVICE — SUT VICRYL 0 27" UR-6

## (undated) DEVICE — TROCAR COVIDIEN VERSAONE FIXATION CANNULA 5MM

## (undated) DEVICE — TROCAR COVIDIEN VERSAONE BLUNT TIP HASSAN 12MM

## (undated) DEVICE — TIP METZENBAUM SCISSOR MONOPOLAR ENDOCUT (ORANGE)

## (undated) DEVICE — PACK GENERAL LAPAROSCOPY

## (undated) DEVICE — ELCTR BOVIE PENCIL BLADE 10FT

## (undated) DEVICE — TROCAR COVIDIEN VERSAPORT BLADELESS OPTICAL 5MM STANDARD

## (undated) DEVICE — SOL IRR BAG NS 0.9% 3000ML

## (undated) DEVICE — VENODYNE/SCD SLEEVE CALF MEDIUM

## (undated) DEVICE — STAPLER COVIDIEN ENDO GIA STANDARD HANDLE

## (undated) DEVICE — SUT MONOCRYL 4-0 18" PS-2

## (undated) DEVICE — D HELP - CLEARVIEW CLEARIFY SYSTEM

## (undated) DEVICE — DRAPE 3/4 SHEET 52X76"

## (undated) DEVICE — POSITIONER FOAM EGG CRATE ULNAR 2PCS (PINK)

## (undated) DEVICE — Device

## (undated) DEVICE — TUBING STRYKER PNEUMOCLEAR HIGH FLOW

## (undated) DEVICE — TROCAR COVIDIEN VERSAPORT BLADELESS OPTICAL 12MM STANDARD

## (undated) DEVICE — DRSG DERMABOND 0.7ML

## (undated) DEVICE — DRAPE 1/2 SHEET 40X57"